# Patient Record
(demographics unavailable — no encounter records)

---

## 2025-03-11 NOTE — REASON FOR VISIT
[Follow-Up] : follow-up for [Cochlear Implant] : cochlear implant [Mother] : mother [Medical Records] : medical records [Language [facility] ] : provided by [employer]   [Time Spent: ____ minutes] : Total time spent using  services: [unfilled] minutes. The patient's primary language is not English thus required  services. [Interpreters_IDNumber] : [id number] [TWNoteComboBox1] : Bangladeshi

## 2025-03-11 NOTE — PLAN
[FreeTextEntry2] : 1.  Consistent use of left cochlear implant and right hearing aid. 2.  Return in 3 months for additional cochlear implant mapping/testing. 3.  Continue under the medical care of Dr. Crowley 4.   The patient's cochlear implant is FDA approved for MRI up to 3.0 Lisa. The  and Dr. Crowley should be contacted prior to testing for further instructions. 5.  Contact the Audiology Clinic should any questions or concerns arise.

## 2025-03-11 NOTE — ASSESSMENT
[FreeTextEntry1] : CI Summary: EQUIPMENT  Left Ear - Cochlear Implant : INFRARED IMAGING SYSTEMS Internal:  Implant date: 2023 Activation date: 3/20/2023 Surgeon: Dr. Crowley Processor types: N8 (x2) Serial numbers:  [id number]; [id number] Magnet strength/color: 1/2I (N8) Black Accessories: Mini-microphone, N8 aqua kit, extra N8 rechargeable battery eSRT conducted 3/25/24  Right Ear - Hearing Aid Oticon Qnovoeed BTE hearing aid  TESTING Attempted testing with left N8 processor only.  Unable to condition patient to conditioned play audiometry.  Patient not interested in VRA.  Unable to obtain reliable results, discontinued testing.  OBJECTIVE MEASURES -Visual inspection of the surgical site indicated good healing, with no redness or swelling noted. -Magnet strength was appropriate for the N8 headpiece. -Impedances were within normal limits for all electrodes, except electrode 11. Electrode 11 is open. -All electrodes are enabled, except electrode 11 due to being open. Electrode 7 re-enabled due to no longer being open. -Dataloggin.3 hours per day  PROGRAMMING: -Coil changed on N8 primary processor. -Attempted to measure T-levels via CPA, patient not interested in task.  Unable to obtain reliable results. -AutoNRT was conducted and present on electrodes 1, 6, 12, 17 and 22. -Swept C-levels. -No programming changes were made due to previous reliable T-measurements and eSRT conducted.  -The following MAP was saved to the N8 processor. P1: MAP 16 Default Volume: 6  Strategy: ACE Rate: 900 Maxima: 8 Pulse Width: 37 Volume Control enabled  Patient left today's appointment wearing N8 processor.  Did not program back-up, patient's mother did not bring it with her.  COUNSELING  -The coil site should be checked regularly for any redness, edema, or discomfort. If any redness is observed, the ENT clinic should be contacted immediately. -Counseled patient's mother importance of Helio wearing right hearing aid and left CI processor together.  She expressed understanding.  Provided patient's mother with copy of most recent audiogram and vendor list for patient to schedule with a provider for his hearing aid that accepts his insurance. -Discussed right cochlear implant.  Patient's mother stated she has not decided yet.  All questions and concerns were answered today.

## 2025-03-11 NOTE — HISTORY OF PRESENT ILLNESS
[FreeTextEntry1] : Helio is a 4-year-old male seen on 3/10/2025 for his left cochlear implant check.    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  Patient received a repeat sedated ABR in 2021 where testing revealed profound SNHL in left ear, and moderately severe to severe SNHL in the right ear.    -Helio was fit with [employer] Xceed behind-the-ear hearing aids through [facility] in 2022.  - Patient was implanted in his left ear by Dr. Crowley on 2023, and activated on 3/20/2023. - Today, the patient's mother reported Helio is doing well with his cochlear implant but does not speak many words.  She stated Helio was out of school for a month due to trying to change schools, however Helio went back to [employer]. - Patient arrived at today's appointment wearing N8 processor.  He was not wearing a hearing aid in the right ear.  Patient's mother reported Helio only wears the hearing aid at home, and does not like to wear it in school.  She stated Helio's hearing aid has not been re-programmed.  Medical History:  - Pre, naomi, and post- history are normal.  - Helio did not require admittance into the NICU at birth.  - Patient's younger sister has bilateral sensorineural hearing loss.  No other family history of hearing loss previously reported.  Developmental History:  - Therapies/Services:  [employer] full-time for .  Receiving speech services 4x a week.